# Patient Record
Sex: FEMALE | Race: OTHER | NOT HISPANIC OR LATINO
[De-identification: names, ages, dates, MRNs, and addresses within clinical notes are randomized per-mention and may not be internally consistent; named-entity substitution may affect disease eponyms.]

---

## 2022-04-26 ENCOUNTER — NON-APPOINTMENT (OUTPATIENT)
Age: 32
End: 2022-04-26

## 2022-05-03 ENCOUNTER — APPOINTMENT (OUTPATIENT)
Dept: OBGYN | Facility: CLINIC | Age: 32
End: 2022-05-03
Payer: COMMERCIAL

## 2022-05-03 VITALS
DIASTOLIC BLOOD PRESSURE: 86 MMHG | SYSTOLIC BLOOD PRESSURE: 128 MMHG | WEIGHT: 145 LBS | BODY MASS INDEX: 29.23 KG/M2 | HEIGHT: 59 IN

## 2022-05-03 DIAGNOSIS — Z00.00 ENCOUNTER FOR GENERAL ADULT MEDICAL EXAMINATION W/OUT ABNORMAL FINDINGS: ICD-10-CM

## 2022-05-03 PROCEDURE — 99385 PREV VISIT NEW AGE 18-39: CPT

## 2022-05-03 NOTE — HISTORY OF PRESENT ILLNESS
[FreeTextEntry1] : 2022. LIONEL TATE 32 year old female  LMP 22, presents for annual gyn exam, PMH ovarian stage 1A ovarian CA and RSO of 17cm ovarian cyst. Questionable h/o ectopic on remaining tube. \par \par She feels well and has no complaints. Presents for preconception counseling. \par She reports monthly menses. She denies intermenstrual bleeding, abn vaginal discharge or vaginitis symptoms. No urinary complaints. BM is normal per patient. She denies abdominal and pelvic pain.\par Pt is sexually active. Denies sexual dysfunction.\par \par Pt had methrotrexate in august and has not attempted for conception gain until 2022. Pt reports tracking menses but doesn’t use. Able to evaluate discharge during ovulation window. \par \par \par PSH: 2020: RIGHT salpingoopherectomy 17CM ovarian cyst early ovarian CA "1A" per patient \par [facility], Dr Harish Matthews \par \par OB[facility]: SAB vs ECTOPIC - received methotrexate Aug 2021\par \par

## 2022-05-03 NOTE — PLAN
[FreeTextEntry1] : Routine Gyn Exam:\par BSE taught\par Pap smear conducted\par \par preconception counseling. d/w pt ovulation predictor kits and menstrual tracking, reviewed with patient PNV and Folic acid and DHA supplementation. \par \par RTO in 3 months if not pregnant\par

## 2022-05-03 NOTE — PROCEDURE
[Cervical Pap Smear] : cervical Pap smear [Liquid Base] : liquid base [Tolerated Well] : the patient tolerated the procedure well [No Complications] : there were no complications [de-identified] : BLOODY

## 2022-05-05 LAB — HPV HIGH+LOW RISK DNA PNL CVX: NOT DETECTED

## 2022-05-09 LAB — CYTOLOGY CVX/VAG DOC THIN PREP: NORMAL

## 2022-08-04 ENCOUNTER — APPOINTMENT (OUTPATIENT)
Dept: OBGYN | Facility: CLINIC | Age: 32
End: 2022-08-04

## 2022-08-04 PROCEDURE — 99213 OFFICE O/P EST LOW 20 MIN: CPT

## 2022-08-04 PROCEDURE — 36415 COLL VENOUS BLD VENIPUNCTURE: CPT

## 2022-08-09 LAB
17OHP SERPL-MCNC: 22 NG/DL
DHEA-S SERPL-MCNC: 154 UG/DL
ESTIMATED AVERAGE GLUCOSE: 120 MG/DL
ESTRADIOL SERPL-MCNC: 24 PG/ML
FSH SERPL-MCNC: 6.4 IU/L
GLUCOSE SERPL-MCNC: 79 MG/DL
HBA1C MFR BLD HPLC: 5.8 %
HCG SERPL-MCNC: <1 MIU/ML
LH SERPL-ACNC: 6.1 IU/L
PROGEST SERPL-MCNC: 0.1 NG/ML
PROLACTIN SERPL-MCNC: 25.5 NG/ML
T4 FREE SERPL-MCNC: 1.1 NG/DL
TESTOST FREE SERPL-MCNC: 1.3 PG/ML
TESTOST SERPL-MCNC: 7.6 NG/DL
TSH SERPL-ACNC: 4.47 UIU/ML

## 2022-08-18 LAB
INSULIN FREE SERPL-MCNC: 19 UU/ML
INSULIN: 19 UU/ML

## 2022-09-28 ENCOUNTER — APPOINTMENT (OUTPATIENT)
Dept: OBGYN | Facility: CLINIC | Age: 32
End: 2022-09-28

## 2022-09-28 VITALS
DIASTOLIC BLOOD PRESSURE: 80 MMHG | HEIGHT: 59 IN | SYSTOLIC BLOOD PRESSURE: 120 MMHG | HEIGHT: 59 IN | BODY MASS INDEX: 29.23 KG/M2 | WEIGHT: 145 LBS

## 2022-09-28 PROCEDURE — 99214 OFFICE O/P EST MOD 30 MIN: CPT

## 2022-09-28 RX ORDER — LETROZOLE TABLETS 2.5 MG/1
2.5 TABLET, FILM COATED ORAL DAILY
Qty: 10 | Refills: 2 | Status: ACTIVE | COMMUNITY
Start: 2022-09-28 | End: 1900-01-01

## 2022-11-03 ENCOUNTER — NON-APPOINTMENT (OUTPATIENT)
Age: 32
End: 2022-11-03

## 2022-11-04 ENCOUNTER — APPOINTMENT (OUTPATIENT)
Dept: OBGYN | Facility: CLINIC | Age: 32
End: 2022-11-04

## 2022-11-04 ENCOUNTER — NON-APPOINTMENT (OUTPATIENT)
Age: 32
End: 2022-11-04

## 2022-11-04 ENCOUNTER — ASOB RESULT (OUTPATIENT)
Age: 32
End: 2022-11-04

## 2022-11-04 VITALS — SYSTOLIC BLOOD PRESSURE: 115 MMHG | DIASTOLIC BLOOD PRESSURE: 78 MMHG | BODY MASS INDEX: 43.05 KG/M2 | WEIGHT: 147 LBS

## 2022-11-04 PROCEDURE — 76817 TRANSVAGINAL US OBSTETRIC: CPT

## 2022-11-04 PROCEDURE — 99213 OFFICE O/P EST LOW 20 MIN: CPT | Mod: 25

## 2022-11-04 PROCEDURE — 36415 COLL VENOUS BLD VENIPUNCTURE: CPT

## 2022-11-05 DIAGNOSIS — Z34.90 ENCOUNTER FOR SUPERVISION OF NORMAL PREGNANCY, UNSPECIFIED, UNSPECIFIED TRIMESTER: ICD-10-CM

## 2022-11-05 LAB
ABO + RH PNL BLD: NORMAL
HCG SERPL-MCNC: 60 MIU/ML

## 2022-11-07 LAB
HCG SERPL-MCNC: 172 MIU/ML
PROGEST SERPL-MCNC: 21.4 NG/ML

## 2022-11-09 ENCOUNTER — TRANSCRIPTION ENCOUNTER (OUTPATIENT)
Age: 32
End: 2022-11-09

## 2022-11-09 ENCOUNTER — APPOINTMENT (OUTPATIENT)
Dept: OBGYN | Facility: CLINIC | Age: 32
End: 2022-11-09

## 2022-11-09 VITALS — SYSTOLIC BLOOD PRESSURE: 119 MMHG | DIASTOLIC BLOOD PRESSURE: 74 MMHG

## 2022-11-09 DIAGNOSIS — O36.80X0 PREGNANCY WITH INCONCLUSIVE FETAL VIABILITY, NOT APPLICABLE OR UNSPECIFIED: ICD-10-CM

## 2022-11-09 PROCEDURE — 99213 OFFICE O/P EST LOW 20 MIN: CPT

## 2022-11-09 RX ORDER — LEVOTHYROXINE SODIUM 0.03 MG/1
25 TABLET ORAL
Qty: 90 | Refills: 3 | Status: ACTIVE | COMMUNITY
Start: 2022-09-28 | End: 1900-01-01

## 2022-11-09 NOTE — HISTORY OF PRESENT ILLNESS
[FreeTextEntry1] : 33yo  at 5wk +2 days by lmp, found to be newly pregnant. Pt has regular cycles. \par Ovulation predictor kit positive on 10/17 per pt. (5wk 2 days)\par \par HCG doubled from 60 to 172 and a progesterone of 20. \par \par today pt denies any pain, cranmping bleeding.

## 2022-11-09 NOTE — PLAN
[FreeTextEntry1] : early pregnancy\par check HCG today and progesterone. \par rx for HCG/progesterone trend sent for FRIDAY and MONDAY\par \par Hypothyroid in pregnancy - refill synthroid. \par \par follow up next week for a sono.

## 2022-11-09 NOTE — PLAN
[FreeTextEntry1] : early pregnancy\par check HCG today and progesterone. \par \par Hypothyroid in pregnancy - refill synthroid. \par \par follow up next week for a sono.

## 2022-11-09 NOTE — PHYSICAL EXAM
[Soft] : soft [Non-tender] : non-tender [Non-distended] : non-distended [FreeTextEntry7] : no rebound no guarding

## 2022-11-09 NOTE — HISTORY OF PRESENT ILLNESS
[FreeTextEntry1] : 31yo  at 5wk +2 days by lmp, found to be newly pregnant. Pt has regular cycles. \par Ovulation predictor kit positive on 10/17 per pt. (5wk 2 days)\par \par HCG doubled from 60 to 172 and a progesterone of 20. \par \par today pt denies any pain, cranmping bleeding.

## 2022-11-10 ENCOUNTER — NON-APPOINTMENT (OUTPATIENT)
Age: 32
End: 2022-11-10

## 2022-11-10 LAB
HCG SERPL-MCNC: 255 MIU/ML
PROGEST SERPL-MCNC: 22.5 NG/ML
T4 FREE SERPL-MCNC: 1.2 NG/DL
TSH SERPL-ACNC: 6.56 UIU/ML

## 2022-11-10 RX ORDER — LEVOTHYROXINE SODIUM 0.05 MG/1
50 TABLET ORAL DAILY
Qty: 90 | Refills: 3 | Status: ACTIVE | COMMUNITY
Start: 2022-11-10 | End: 1900-01-01

## 2022-11-13 ENCOUNTER — EMERGENCY (EMERGENCY)
Facility: HOSPITAL | Age: 32
LOS: 1 days | Discharge: HOME | End: 2022-11-13
Attending: STUDENT IN AN ORGANIZED HEALTH CARE EDUCATION/TRAINING PROGRAM
Payer: COMMERCIAL

## 2022-11-13 VITALS
SYSTOLIC BLOOD PRESSURE: 129 MMHG | HEIGHT: 59 IN | RESPIRATION RATE: 16 BRPM | OXYGEN SATURATION: 99 % | HEART RATE: 134 BPM | WEIGHT: 147.05 LBS | TEMPERATURE: 100 F | DIASTOLIC BLOOD PRESSURE: 80 MMHG

## 2022-11-13 LAB
ALBUMIN SERPL ELPH-MCNC: 4.1 G/DL — SIGNIFICANT CHANGE UP (ref 3.3–5)
ALP SERPL-CCNC: 65 U/L — SIGNIFICANT CHANGE UP (ref 40–120)
ALT FLD-CCNC: 27 U/L — SIGNIFICANT CHANGE UP (ref 10–45)
ANION GAP SERPL CALC-SCNC: 16 MMOL/L — SIGNIFICANT CHANGE UP (ref 5–17)
AST SERPL-CCNC: 23 U/L — SIGNIFICANT CHANGE UP (ref 10–40)
BASE EXCESS BLDV CALC-SCNC: -1.2 MMOL/L — SIGNIFICANT CHANGE UP (ref -2–3)
BASOPHILS # BLD AUTO: 0.03 K/UL — SIGNIFICANT CHANGE UP (ref 0–0.2)
BASOPHILS NFR BLD AUTO: 0.5 % — SIGNIFICANT CHANGE UP (ref 0–2)
BILIRUB SERPL-MCNC: 0.2 MG/DL — SIGNIFICANT CHANGE UP (ref 0.2–1.2)
BLD GP AB SCN SERPL QL: NEGATIVE — SIGNIFICANT CHANGE UP
BLOOD GAS VENOUS - CREATININE: SIGNIFICANT CHANGE UP MG/DL (ref 0.5–1.3)
BUN SERPL-MCNC: 8 MG/DL — SIGNIFICANT CHANGE UP (ref 7–23)
CA-I SERPL-SCNC: 1.15 MMOL/L — SIGNIFICANT CHANGE UP (ref 1.15–1.33)
CALCIUM SERPL-MCNC: 8.8 MG/DL — SIGNIFICANT CHANGE UP (ref 8.4–10.5)
CHLORIDE BLDV-SCNC: 104 MMOL/L — SIGNIFICANT CHANGE UP (ref 96–108)
CHLORIDE SERPL-SCNC: 102 MMOL/L — SIGNIFICANT CHANGE UP (ref 96–108)
CO2 BLDV-SCNC: 26 MMOL/L — SIGNIFICANT CHANGE UP (ref 22–26)
CO2 SERPL-SCNC: 18 MMOL/L — LOW (ref 22–31)
CREAT SERPL-MCNC: 0.58 MG/DL — SIGNIFICANT CHANGE UP (ref 0.5–1.3)
EGFR: 125 ML/MIN/1.73M2 — SIGNIFICANT CHANGE UP
EOSINOPHIL # BLD AUTO: 0.06 K/UL — SIGNIFICANT CHANGE UP (ref 0–0.5)
EOSINOPHIL NFR BLD AUTO: 0.9 % — SIGNIFICANT CHANGE UP (ref 0–6)
GAS PNL BLDV: 135 MMOL/L — LOW (ref 136–145)
GAS PNL BLDV: SIGNIFICANT CHANGE UP
GAS PNL BLDV: SIGNIFICANT CHANGE UP
GLUCOSE BLDV-MCNC: 106 MG/DL — HIGH (ref 70–99)
GLUCOSE SERPL-MCNC: 101 MG/DL — HIGH (ref 70–99)
HCG SERPL-ACNC: 13.2 MIU/ML — HIGH
HCO3 BLDV-SCNC: 24 MMOL/L — SIGNIFICANT CHANGE UP (ref 22–29)
HCT VFR BLD CALC: 31.8 % — LOW (ref 34.5–45)
HCT VFR BLDA CALC: 29 % — LOW (ref 34.5–46.5)
HGB BLD CALC-MCNC: 9.8 G/DL — LOW (ref 11.7–16.1)
HGB BLD-MCNC: 10.1 G/DL — LOW (ref 11.5–15.5)
IMM GRANULOCYTES NFR BLD AUTO: 0.5 % — SIGNIFICANT CHANGE UP (ref 0–0.9)
LACTATE BLDV-MCNC: 2.1 MMOL/L — HIGH (ref 0.5–2)
LYMPHOCYTES # BLD AUTO: 1.25 K/UL — SIGNIFICANT CHANGE UP (ref 1–3.3)
LYMPHOCYTES # BLD AUTO: 19.6 % — SIGNIFICANT CHANGE UP (ref 13–44)
MCHC RBC-ENTMCNC: 27.2 PG — SIGNIFICANT CHANGE UP (ref 27–34)
MCHC RBC-ENTMCNC: 31.8 GM/DL — LOW (ref 32–36)
MCV RBC AUTO: 85.7 FL — SIGNIFICANT CHANGE UP (ref 80–100)
MONOCYTES # BLD AUTO: 0.53 K/UL — SIGNIFICANT CHANGE UP (ref 0–0.9)
MONOCYTES NFR BLD AUTO: 8.3 % — SIGNIFICANT CHANGE UP (ref 2–14)
NEUTROPHILS # BLD AUTO: 4.49 K/UL — SIGNIFICANT CHANGE UP (ref 1.8–7.4)
NEUTROPHILS NFR BLD AUTO: 70.2 % — SIGNIFICANT CHANGE UP (ref 43–77)
NRBC # BLD: 0 /100 WBCS — SIGNIFICANT CHANGE UP (ref 0–0)
PCO2 BLDV: 43 MMHG — HIGH (ref 39–42)
PH BLDV: 7.36 — SIGNIFICANT CHANGE UP (ref 7.32–7.43)
PLATELET # BLD AUTO: 224 K/UL — SIGNIFICANT CHANGE UP (ref 150–400)
PO2 BLDV: 39 MMHG — SIGNIFICANT CHANGE UP (ref 25–45)
POTASSIUM BLDV-SCNC: 3.4 MMOL/L — LOW (ref 3.5–5.1)
POTASSIUM SERPL-MCNC: 3.8 MMOL/L — SIGNIFICANT CHANGE UP (ref 3.5–5.3)
POTASSIUM SERPL-SCNC: 3.8 MMOL/L — SIGNIFICANT CHANGE UP (ref 3.5–5.3)
PROT SERPL-MCNC: 7.5 G/DL — SIGNIFICANT CHANGE UP (ref 6–8.3)
RBC # BLD: 3.71 M/UL — LOW (ref 3.8–5.2)
RBC # FLD: 13.5 % — SIGNIFICANT CHANGE UP (ref 10.3–14.5)
RH IG SCN BLD-IMP: POSITIVE — SIGNIFICANT CHANGE UP
SAO2 % BLDV: 67.5 % — SIGNIFICANT CHANGE UP (ref 67–88)
SODIUM SERPL-SCNC: 136 MMOL/L — SIGNIFICANT CHANGE UP (ref 135–145)
WBC # BLD: 6.39 K/UL — SIGNIFICANT CHANGE UP (ref 3.8–10.5)
WBC # FLD AUTO: 6.39 K/UL — SIGNIFICANT CHANGE UP (ref 3.8–10.5)

## 2022-11-13 PROCEDURE — 71045 X-RAY EXAM CHEST 1 VIEW: CPT | Mod: 26

## 2022-11-13 PROCEDURE — 74177 CT ABD & PELVIS W/CONTRAST: CPT | Mod: 26,MA

## 2022-11-13 PROCEDURE — 99285 EMERGENCY DEPT VISIT HI MDM: CPT

## 2022-11-13 RX ORDER — ACETAMINOPHEN 500 MG
975 TABLET ORAL ONCE
Refills: 0 | Status: COMPLETED | OUTPATIENT
Start: 2022-11-13 | End: 2022-11-13

## 2022-11-13 RX ADMIN — Medication 975 MILLIGRAM(S): at 23:03

## 2022-11-13 NOTE — ED ADULT NURSE NOTE - OBJECTIVE STATEMENT
33 y/o female A2 presents to ED with c/o fever. Pt is a&ox4, spontaneous respirations and equal chest rise. Pt reports having a fever for the past 2 two days. She is currently going through a chemical pregnancy and came to the ED because she is concerned that she is septic. Pt denies CP, SOB, n/v/d, abdominal pain. She took 500mg of po tylenol PTA. Pt presents to ED tachycardic in the 130s. Her HR is now in the low 100s (see flowsheet). She does not appear to be in any acute distress at this time. 20G PIV inserted to left AC. Pt accompanied by her spouse. Bed locked and in lowest position and nonslip footwear on for safety. Will continue to monitor.

## 2022-11-13 NOTE — ED PROVIDER NOTE - ATTENDING CONTRIBUTION TO CARE
I have personally seen and examined this patient.  I have fully participated in the care of this patient. I performed a substantive portion of the visit including all aspects of the medical decision making. I have reviewed all pertinent clinical information, including history, physical exam, plan and the Resident’s note and agree except as noted. - MD Shavon.    agree with mdm, additionally, no h/o std but pt needs infectious work up, particulary focussing on the source of infection as reproductive organs, dispo or abx is accordingly to ct or transvaginal us findings, the patient is pending CT/US, signed out to Dr. Bishnu Chairez

## 2022-11-13 NOTE — ED PROVIDER NOTE - NS ED ROS FT
Constitutional: fevers. chills  HEENT: no cough, rhinorrhea  Cardiac: no chest pain, palpitations  Respiratory: no SOB  GI: no n/v, abd pain, bloody or dark stools  : Vaginal bleeding.  No dysuria, frequency, or hematuria  MSK: no joint pain  Skin: no rashes  Neuro: no headache, change in vision, weakness  Psych: negative

## 2022-11-13 NOTE — ED PROVIDER NOTE - NSFOLLOWUPCLINICS_GEN_ALL_ED_FT
[facility]  OB/GYN & Surg  [address] [place] [id number]  Phone: [phone]  Fax:     [facility]  Gynceology/OB  [address] [place] [address]  Phone: [phone]  Fax:     [facility]  OB-GYN  [address] [place] [address]  Phone: [phone]  Fax:

## 2022-11-13 NOTE — ED ADULT TRIAGE NOTE - MEANS OF ARRIVAL
Progress Notes by Camila Babin at 11/03/17 03:39 PM     Author:  Camila Babin Service:  (none) Author Type:  Certified Nursing Assistant     Filed:  11/06/17 10:23 AM Encounter Date:  11/3/2017 Status:  Addendum     :  Camila Babin (Certified Nursing Assistant)            OPENED IN ERROR[KR1.1M]      Revision History        User Key Date/Time User Provider Type Action    > KR1.1 11/06/17 10:23 AM Camila Babin Certified Nursing Assistant Addend     [N/A] 11/03/17 03:51 PM Camila Babin Certified Nursing Assistant Sign    M - Manual            
Progress Notes by Camila Babin at 11/03/17 03:49 PM     Author:  Camila Babin Service:  (none) Author Type:  Certified Nursing Assistant     Filed:  11/06/17 10:23 AM Encounter Date:  11/3/2017 Status:  Addendum     :  Camila Babin (Certified Nursing Assistant)            OPENED IN ERROR[KR1.1M]       Revision History        User Key Date/Time User Provider Type Action    > KR1.1 11/06/17 10:23 AM Camila Babin Certified Nursing Assistant Addend     [N/A] 11/03/17 03:51 PM Camila Babin Certified Nursing Assistant Sign    M - Manual            
ambulatory

## 2022-11-13 NOTE — ED ADULT TRIAGE NOTE - CHIEF COMPLAINT QUOTE
Fever since yesterday, took Tylenol prior to arrival  Vaginal bleeding started on Thursday 11/10, was 5 weeks pregnant but OBGYN told pt it may have been chemical pregnancy, currently "spotting"

## 2022-11-13 NOTE — ED PROVIDER NOTE - OBJECTIVE STATEMENT
32-year-old female pmhx of recent miscarriage comes to ED w/ fever for 1 day and vaginal bleeding for 3 days. Their pain/symptom is moderate, constant, non mediating with rest and Tylenol use, associated with symptoms of recent vaginal bleeding over the past 3 days, positive preg test 11/2/22, Patient had their hcg trended by their OBGYN due to their prior miscarriages and was informed on Friday that it was a chemical pregnancy. Reports symptoms of vaginal bleeding for the past 3 days, denies falls, trauma, nausea, vomiting, diarrhea, dysuria, urinary frequency, hematuria, bloody or dark stools.

## 2022-11-13 NOTE — ED PROVIDER NOTE - PATIENT PORTAL LINK FT
You can access the FollowMyHealth Patient Portal offered by [facility] by registering at the following website: [email or web address]. By joining [email or web address] FollowMyHealth portal, you will also be able to view your health information using other applications (apps) compatible with our system.

## 2022-11-13 NOTE — ED PROVIDER NOTE - CLINICAL SUMMARY MEDICAL DECISION MAKING FREE TEXT BOX
Impression: 32-year-old female pmhx of recent miscarriage comes to ED w/ fever for 1 day and vaginal bleeding for 3 days. Their symptoms are concerning for infection: viral illness, uterine infection from retained products of conception, uti, pneumonia. Will evaluate for infectious source.    Ordered labs, imaging, medications for diagnosis, management, and treatment.

## 2022-11-13 NOTE — ED PROVIDER NOTE - PROGRESS NOTE DETAILS
Pelvic exam performed, + small amount of BRB in vaginal vault, os closed, no CMT, no adnexal masses. -YUE KohlerC Improvement on symptoms. Spoke to patient about results and lack of urgent or emergent pathology requiring admission at this time. Plan to discharge patient. Patient given OBGYN follow up and return precautions. Patient agrees with plan. Improvement on symptoms. Vitals stable. Spoke to patient about results and lack of urgent or emergent pathology requiring admission at this time. Plan to discharge patient. Patient given OBGYN follow up and return precautions. Patient agrees with plan.

## 2022-11-13 NOTE — ED PROVIDER NOTE - NSFOLLOWUPINSTRUCTIONS_ED_ALL_ED_FT
You were seen in the Emergency Department for fever and vaginal bleeding. You were found to have an Left ovarian cyst on Ultrasound.    1) Advance activity as tolerated.   2) Continue all previously prescribed medications as directed.    3) Follow up with OBGYN and your primary care physician in 3-5 days - take copies of your results.    4) Return to the Emergency Department for worsening or persistent symptoms, and/or ANY NEW OR CONCERNING SYMPTOMS.

## 2022-11-14 VITALS
RESPIRATION RATE: 16 BRPM | HEART RATE: 94 BPM | DIASTOLIC BLOOD PRESSURE: 79 MMHG | SYSTOLIC BLOOD PRESSURE: 108 MMHG | OXYGEN SATURATION: 100 %

## 2022-11-14 LAB
APPEARANCE UR: CLEAR — SIGNIFICANT CHANGE UP
BACTERIA # UR AUTO: NEGATIVE — SIGNIFICANT CHANGE UP
BILIRUB UR-MCNC: NEGATIVE — SIGNIFICANT CHANGE UP
COLOR SPEC: SIGNIFICANT CHANGE UP
DIFF PNL FLD: ABNORMAL
EPI CELLS # UR: 0 /HPF — SIGNIFICANT CHANGE UP
FLUAV AG NPH QL: SIGNIFICANT CHANGE UP
FLUBV AG NPH QL: SIGNIFICANT CHANGE UP
GLUCOSE UR QL: NEGATIVE — SIGNIFICANT CHANGE UP
HCG SERPL-MCNC: 91 MIU/ML
HYALINE CASTS # UR AUTO: 0 /LPF — SIGNIFICANT CHANGE UP (ref 0–7)
KETONES UR-MCNC: NEGATIVE — SIGNIFICANT CHANGE UP
LEUKOCYTE ESTERASE UR-ACNC: NEGATIVE — SIGNIFICANT CHANGE UP
NITRITE UR-MCNC: NEGATIVE — SIGNIFICANT CHANGE UP
PH UR: 7 — SIGNIFICANT CHANGE UP (ref 5–8)
PROGEST SERPL-MCNC: 3.6 NG/ML
PROT UR-MCNC: ABNORMAL
RBC CASTS # UR COMP ASSIST: 17 /HPF — HIGH (ref 0–4)
RSV RNA NPH QL NAA+NON-PROBE: SIGNIFICANT CHANGE UP
SARS-COV-2 RNA SPEC QL NAA+PROBE: SIGNIFICANT CHANGE UP
SP GR SPEC: >1.05 (ref 1.01–1.02)
UROBILINOGEN FLD QL: NEGATIVE — SIGNIFICANT CHANGE UP
WBC UR QL: 1 /HPF — SIGNIFICANT CHANGE UP (ref 0–5)

## 2022-11-14 PROCEDURE — 74177 CT ABD & PELVIS W/CONTRAST: CPT | Mod: MA

## 2022-11-14 PROCEDURE — 76830 TRANSVAGINAL US NON-OB: CPT

## 2022-11-14 PROCEDURE — 36415 COLL VENOUS BLD VENIPUNCTURE: CPT

## 2022-11-14 PROCEDURE — 76830 TRANSVAGINAL US NON-OB: CPT | Mod: 26

## 2022-11-14 PROCEDURE — 87040 BLOOD CULTURE FOR BACTERIA: CPT

## 2022-11-14 PROCEDURE — 87086 URINE CULTURE/COLONY COUNT: CPT

## 2022-11-14 PROCEDURE — 86850 RBC ANTIBODY SCREEN: CPT

## 2022-11-14 PROCEDURE — 82565 ASSAY OF CREATININE: CPT

## 2022-11-14 PROCEDURE — 85018 HEMOGLOBIN: CPT

## 2022-11-14 PROCEDURE — 87637 SARSCOV2&INF A&B&RSV AMP PRB: CPT

## 2022-11-14 PROCEDURE — 93005 ELECTROCARDIOGRAM TRACING: CPT

## 2022-11-14 PROCEDURE — 84132 ASSAY OF SERUM POTASSIUM: CPT

## 2022-11-14 PROCEDURE — 82803 BLOOD GASES ANY COMBINATION: CPT

## 2022-11-14 PROCEDURE — 84702 CHORIONIC GONADOTROPIN TEST: CPT

## 2022-11-14 PROCEDURE — 84295 ASSAY OF SERUM SODIUM: CPT

## 2022-11-14 PROCEDURE — 82330 ASSAY OF CALCIUM: CPT

## 2022-11-14 PROCEDURE — 82435 ASSAY OF BLOOD CHLORIDE: CPT

## 2022-11-14 PROCEDURE — 82947 ASSAY GLUCOSE BLOOD QUANT: CPT

## 2022-11-14 PROCEDURE — 93975 VASCULAR STUDY: CPT

## 2022-11-14 PROCEDURE — 71045 X-RAY EXAM CHEST 1 VIEW: CPT

## 2022-11-14 PROCEDURE — 81001 URINALYSIS AUTO W/SCOPE: CPT

## 2022-11-14 PROCEDURE — 83605 ASSAY OF LACTIC ACID: CPT

## 2022-11-14 PROCEDURE — 80053 COMPREHEN METABOLIC PANEL: CPT

## 2022-11-14 PROCEDURE — 85014 HEMATOCRIT: CPT

## 2022-11-14 PROCEDURE — 86901 BLOOD TYPING SEROLOGIC RH(D): CPT

## 2022-11-14 PROCEDURE — 99285 EMERGENCY DEPT VISIT HI MDM: CPT | Mod: 25

## 2022-11-14 PROCEDURE — 86900 BLOOD TYPING SEROLOGIC ABO: CPT

## 2022-11-14 PROCEDURE — 93975 VASCULAR STUDY: CPT | Mod: 26

## 2022-11-14 PROCEDURE — 85025 COMPLETE CBC W/AUTO DIFF WBC: CPT

## 2022-11-14 NOTE — ED ADULT NURSE REASSESSMENT NOTE - NS ED NURSE REASSESS COMMENT FT1
Pt a&ox3, spontaneous respirations and equal chest rise. Pt now afebrile. VSS on RA. Pt offers no complaints at this time.  at bedside. Safety precautions maintained. Will continue to monitor.

## 2022-11-15 ENCOUNTER — APPOINTMENT (OUTPATIENT)
Dept: OBGYN | Facility: CLINIC | Age: 32
End: 2022-11-15

## 2022-11-15 LAB
CULTURE RESULTS: SIGNIFICANT CHANGE UP
SPECIMEN SOURCE: SIGNIFICANT CHANGE UP

## 2022-11-19 LAB
CULTURE RESULTS: SIGNIFICANT CHANGE UP
CULTURE RESULTS: SIGNIFICANT CHANGE UP
SPECIMEN SOURCE: SIGNIFICANT CHANGE UP
SPECIMEN SOURCE: SIGNIFICANT CHANGE UP

## 2022-11-28 ENCOUNTER — APPOINTMENT (OUTPATIENT)
Dept: OBGYN | Facility: CLINIC | Age: 32
End: 2022-11-28

## 2022-12-07 ENCOUNTER — APPOINTMENT (OUTPATIENT)
Dept: OBGYN | Facility: CLINIC | Age: 32
End: 2022-12-07
Payer: COMMERCIAL

## 2022-12-07 ENCOUNTER — ASOB RESULT (OUTPATIENT)
Age: 32
End: 2022-12-07

## 2022-12-07 VITALS
SYSTOLIC BLOOD PRESSURE: 109 MMHG | DIASTOLIC BLOOD PRESSURE: 75 MMHG | WEIGHT: 147 LBS | BODY MASS INDEX: 29.64 KG/M2 | HEIGHT: 59 IN

## 2022-12-07 DIAGNOSIS — N83.8 OTHER NONINFLAMMATORY DISORDERS OF OVARY, FALLOPIAN TUBE AND BROAD LIGAMENT: ICD-10-CM

## 2022-12-07 PROCEDURE — 76830 TRANSVAGINAL US NON-OB: CPT

## 2022-12-07 PROCEDURE — 99213 OFFICE O/P EST LOW 20 MIN: CPT | Mod: 25

## 2022-12-07 PROCEDURE — 99213 OFFICE O/P EST LOW 20 MIN: CPT

## 2022-12-13 LAB
CANCER AG125 SERPL-ACNC: 19 U/ML
ESTRADIOL SERPL-MCNC: 138 PG/ML
HCG SERPL-MCNC: <1 MIU/ML

## 2023-01-23 ENCOUNTER — APPOINTMENT (OUTPATIENT)
Dept: OBGYN | Facility: CLINIC | Age: 33
End: 2023-01-23
Payer: COMMERCIAL

## 2023-01-23 ENCOUNTER — ASOB RESULT (OUTPATIENT)
Age: 33
End: 2023-01-23

## 2023-01-23 VITALS — DIASTOLIC BLOOD PRESSURE: 80 MMHG | WEIGHT: 145 LBS | SYSTOLIC BLOOD PRESSURE: 122 MMHG | BODY MASS INDEX: 29.29 KG/M2

## 2023-01-23 DIAGNOSIS — Z31.69 ENCOUNTER FOR OTHER GENERAL COUNSELING AND ADVICE ON PROCREATION: ICD-10-CM

## 2023-01-23 DIAGNOSIS — N83.209 UNSPECIFIED OVARIAN CYST, UNSPECIFIED SIDE: ICD-10-CM

## 2023-01-23 DIAGNOSIS — N97.9 FEMALE INFERTILITY, UNSPECIFIED: ICD-10-CM

## 2023-01-23 PROCEDURE — 81025 URINE PREGNANCY TEST: CPT

## 2023-01-23 PROCEDURE — 99214 OFFICE O/P EST MOD 30 MIN: CPT | Mod: 25

## 2023-01-23 PROCEDURE — 99214 OFFICE O/P EST MOD 30 MIN: CPT

## 2023-01-23 PROCEDURE — 76830 TRANSVAGINAL US NON-OB: CPT

## 2023-01-24 LAB
HCG SERPL-MCNC: <1 MIU/ML
HCG UR QL: NEGATIVE
T4 FREE SERPL-MCNC: 1.3 NG/DL
TSH SERPL-ACNC: 3.51 UIU/ML

## 2023-02-01 ENCOUNTER — OUTPATIENT (OUTPATIENT)
Dept: OUTPATIENT SERVICES | Facility: HOSPITAL | Age: 33
LOS: 1 days | End: 2023-02-01
Payer: SELF-PAY

## 2023-02-01 VITALS
RESPIRATION RATE: 16 BRPM | DIASTOLIC BLOOD PRESSURE: 70 MMHG | SYSTOLIC BLOOD PRESSURE: 114 MMHG | TEMPERATURE: 98 F | HEART RATE: 94 BPM | OXYGEN SATURATION: 100 %

## 2023-02-01 VITALS
TEMPERATURE: 98 F | RESPIRATION RATE: 18 BRPM | SYSTOLIC BLOOD PRESSURE: 106 MMHG | OXYGEN SATURATION: 100 % | HEART RATE: 84 BPM | DIASTOLIC BLOOD PRESSURE: 61 MMHG

## 2023-02-01 DIAGNOSIS — N97.9 FEMALE INFERTILITY, UNSPECIFIED: ICD-10-CM

## 2023-02-01 LAB — HCG UR QL: NEGATIVE — SIGNIFICANT CHANGE UP

## 2023-02-02 ENCOUNTER — RESULT REVIEW (OUTPATIENT)
Age: 33
End: 2023-02-02

## 2023-02-02 PROCEDURE — 74740 X-RAY FEMALE GENITAL TRACT: CPT | Mod: 26,GC

## 2023-02-02 PROCEDURE — 58340 CATHETER FOR HYSTEROGRAPHY: CPT | Mod: GC

## 2023-02-22 ENCOUNTER — NON-APPOINTMENT (OUTPATIENT)
Age: 33
End: 2023-02-22

## 2023-03-01 DIAGNOSIS — N91.2 AMENORRHEA, UNSPECIFIED: ICD-10-CM

## 2023-03-01 DIAGNOSIS — E03.9 HYPOTHYROIDISM, UNSPECIFIED: ICD-10-CM

## 2023-03-01 RX ORDER — MEDROXYPROGESTERONE ACETATE 10 MG/1
10 TABLET ORAL DAILY
Qty: 10 | Refills: 0 | Status: ACTIVE | COMMUNITY
Start: 2023-03-01 | End: 1900-01-01